# Patient Record
Sex: FEMALE | Race: WHITE | NOT HISPANIC OR LATINO | Employment: FULL TIME | URBAN - NONMETROPOLITAN AREA
[De-identification: names, ages, dates, MRNs, and addresses within clinical notes are randomized per-mention and may not be internally consistent; named-entity substitution may affect disease eponyms.]

---

## 2018-05-22 ENCOUNTER — OFFICE VISIT (OUTPATIENT)
Dept: OBSTETRICS AND GYNECOLOGY | Facility: CLINIC | Age: 63
End: 2018-05-22

## 2018-05-22 VITALS
DIASTOLIC BLOOD PRESSURE: 64 MMHG | SYSTOLIC BLOOD PRESSURE: 126 MMHG | HEIGHT: 62 IN | BODY MASS INDEX: 24.84 KG/M2 | WEIGHT: 135 LBS

## 2018-05-22 DIAGNOSIS — N95.1 MENOPAUSAL SYMPTOMS: ICD-10-CM

## 2018-05-22 DIAGNOSIS — N89.8 VAGINAL DRYNESS: ICD-10-CM

## 2018-05-22 DIAGNOSIS — N39.3 SUI (STRESS URINARY INCONTINENCE, FEMALE): ICD-10-CM

## 2018-05-22 DIAGNOSIS — Z80.0 FAMILY HISTORY OF PANCREATIC CANCER: ICD-10-CM

## 2018-05-22 DIAGNOSIS — Z01.419 WELL WOMAN EXAM WITH ROUTINE GYNECOLOGICAL EXAM: Primary | ICD-10-CM

## 2018-05-22 PROCEDURE — 99213 OFFICE O/P EST LOW 20 MIN: CPT | Performed by: NURSE PRACTITIONER

## 2018-05-22 PROCEDURE — 99396 PREV VISIT EST AGE 40-64: CPT | Performed by: NURSE PRACTITIONER

## 2018-05-22 RX ORDER — ASPIRIN 81 MG/1
81 TABLET, CHEWABLE ORAL DAILY
COMMUNITY
End: 2020-02-04

## 2018-05-22 RX ORDER — HYDROCHLOROTHIAZIDE 25 MG/1
25 TABLET ORAL
COMMUNITY
End: 2020-02-04

## 2018-05-22 NOTE — PROGRESS NOTES
Attempted to obtain health maintenance information, patient unable to provide answers for the following items Tdap, Diabetic Eye Exam, Diabetic Foot Exam, HPV Vaccine, Chlamydia Screening  and Zoster Vaccine  .

## 2018-05-22 NOTE — PATIENT INSTRUCTIONS

## 2018-05-22 NOTE — PROGRESS NOTES
"Subjective     Brianna Zambrano is a 63 y.o. female    Here for yearly check up. Has C/O MEGAN and has tried many of the OAB medications with no improvement.      Gynecologic Exam   The patient's pertinent negatives include no pelvic pain, vaginal bleeding or vaginal discharge. The patient is experiencing no pain. Associated symptoms include frequency and urgency. Pertinent negatives include no abdominal pain, anorexia, back pain, chills, constipation, diarrhea, discolored urine, dysuria, fever, flank pain, headaches, hematuria, joint pain, joint swelling, nausea, painful intercourse, rash, sore throat or vomiting. Nothing aggravates the symptoms. She is sexually active. She uses hysterectomy for contraception. She is postmenopausal.   Urinary Frequency    This is a chronic problem. The current episode started more than 1 year ago. The problem occurs intermittently. The problem has been waxing and waning. The patient is experiencing no pain. There has been no fever. Associated symptoms include frequency and urgency. Pertinent negatives include no chills, flank pain, hematuria, hesitancy, nausea, possible pregnancy, sweats or vomiting.         /63   Ht 157.5 cm (62\")   Wt 61.2 kg (135 lb)   LMP 05/22/1995 (Approximate) Comment: Abnormal Paps  BMI 24.69 kg/m²     Outpatient Encounter Prescriptions as of 5/22/2018   Medication Sig Dispense Refill   • aspirin 81 MG chewable tablet Chew 81 mg Daily.     • estrogens, conjugated, (PREMARIN) 0.625 MG tablet Take 1 tablet by mouth Daily. 30 tablet 12   • hydrochlorothiazide (HYDRODIURIL) 25 MG tablet Take 25 mg by mouth.     • [DISCONTINUED] estrogens, conjugated, (PREMARIN) 0.625 MG tablet Take 0.625 mg by mouth.       No facility-administered encounter medications on file as of 5/22/2018.        Surgical History  Past Surgical History:   Procedure Laterality Date   • ADENOIDECTOMY     • ANKLE MEDIAL MALLEOUS EPIPHYSIODESIS W/ SCREW FIXATION     • APPENDECTOMY     • " BREAST BIOPSY     •  SECTION     • CHOLECYSTECTOMY     • COLONOSCOPY  2016   • FIRST RIB RESECTION     • HYSTERECTOMY      with BSO   • MASTECTOMY      subcutaneous   • SHOULDER SURGERY     • TONSILLECTOMY     • WISDOM TOOTH EXTRACTION         Family History  Family History   Problem Relation Age of Onset   • Prostate cancer Father    • Stroke Mother    • Heart disease Brother    • Lymphoma Sister    • Lung cancer Brother    • Pancreatic cancer Paternal Grandmother    • Breast cancer Neg Hx    • Ovarian cancer Neg Hx    • Uterine cancer Neg Hx    • Colon cancer Neg Hx    • Melanoma Neg Hx        The following portions of the patient's history were reviewed and updated as appropriate: allergies, current medications, past family history, past medical history, past social history, past surgical history and problem list.    Review of Systems   Constitutional: Negative for activity change, appetite change, chills, diaphoresis, fatigue, fever and unexpected weight change.   HENT: Negative for congestion, dental problem, drooling, ear discharge, ear pain, facial swelling, hearing loss, mouth sores, nosebleeds, postnasal drip, rhinorrhea, sinus pain, sinus pressure, sneezing, sore throat, tinnitus, trouble swallowing and voice change.    Eyes: Negative for photophobia, pain, discharge, redness, itching and visual disturbance.   Respiratory: Negative for apnea, cough, choking, chest tightness, shortness of breath, wheezing and stridor.    Cardiovascular: Negative for chest pain, palpitations and leg swelling.   Gastrointestinal: Negative for abdominal distention, abdominal pain, anal bleeding, anorexia, blood in stool, constipation, diarrhea, nausea, rectal pain and vomiting.   Endocrine: Negative for cold intolerance, heat intolerance, polydipsia, polyphagia and polyuria.   Genitourinary: Positive for frequency and urgency. Negative for decreased urine volume, difficulty urinating, dyspareunia, dysuria, enuresis,  flank pain, genital sores, hematuria, hesitancy, menstrual problem, pelvic pain, vaginal bleeding, vaginal discharge and vaginal pain.   Musculoskeletal: Negative for arthralgias, back pain, gait problem, joint pain, joint swelling, myalgias, neck pain and neck stiffness.   Skin: Negative for color change, pallor, rash and wound.   Allergic/Immunologic: Negative for environmental allergies, food allergies and immunocompromised state.   Neurological: Negative for dizziness, tremors, seizures, syncope, facial asymmetry, speech difficulty, weakness, light-headedness, numbness and headaches.   Hematological: Negative for adenopathy. Does not bruise/bleed easily.   Psychiatric/Behavioral: Negative for agitation, behavioral problems, confusion, decreased concentration, dysphoric mood, hallucinations, self-injury, sleep disturbance and suicidal ideas. The patient is not nervous/anxious and is not hyperactive.        Objective   Physical Exam   Constitutional: She is oriented to person, place, and time. She appears well-developed and well-nourished.   HENT:   Head: Normocephalic and atraumatic.   Right Ear: External ear normal.   Left Ear: External ear normal.   Eyes: Conjunctivae and EOM are normal. Right eye exhibits no discharge. Left eye exhibits no discharge. No scleral icterus.   Neck: Normal range of motion. Neck supple. Carotid bruit is not present. No thyromegaly present.   Cardiovascular: Regular rhythm and normal heart sounds.    No murmur heard.  Pulmonary/Chest: Effort normal and breath sounds normal. No respiratory distress. Right breast exhibits no inverted nipple, no mass, no nipple discharge, no skin change and no tenderness. Left breast exhibits no inverted nipple, no mass, no nipple discharge, no skin change and no tenderness. Breasts are symmetrical. There is no breast swelling.   Abdominal: Soft. Bowel sounds are normal. She exhibits no distension and no mass. There is no tenderness. There is no  guarding. No hernia. Hernia confirmed negative in the right inguinal area and confirmed negative in the left inguinal area.   Genitourinary: Vagina normal. Rectal exam shows no mass. No breast tenderness, discharge or bleeding. There is no rash, tenderness, lesion or injury on the right labia. There is no rash, tenderness, lesion or injury on the left labia. No erythema or bleeding in the vagina. No signs of injury around the vagina. No vaginal discharge found.   Genitourinary Comments: Cervix, Uterus and Adnexa are surgically absent.  Urethra and urethral meatus normal  Bladder, mild prolapse  Perineum and anus examined and without lesions   Musculoskeletal: Normal range of motion. She exhibits no edema or tenderness.   Lymphadenopathy:        Head (right side): No submental, no submandibular, no tonsillar, no preauricular, no posterior auricular and no occipital adenopathy present.        Head (left side): No submental, no submandibular, no tonsillar, no preauricular, no posterior auricular and no occipital adenopathy present.     She has no cervical adenopathy.        Right cervical: No superficial cervical, no deep cervical and no posterior cervical adenopathy present.       Left cervical: No superficial cervical, no deep cervical and no posterior cervical adenopathy present.     She has no axillary adenopathy.        Right: No inguinal adenopathy present.        Left: No inguinal adenopathy present.   Neurological: She is alert and oriented to person, place, and time. She exhibits normal muscle tone. Coordination normal.   Skin: Skin is warm and dry. No bruising and no rash noted. No erythema.   Psychiatric: She has a normal mood and affect. Her behavior is normal. Judgment and thought content normal.   Nursing note and vitals reviewed.      Assessment/Plan   Brianna was seen today for gynecologic exam.    Diagnoses and all orders for this visit:    Well woman exam with routine gynecological exam  Normal GYN  exam. Will have lab work at PCP. Encouraged SBE, pt is aware how to do self breast exam and the importance of same. Discussed weight management and importance of maintaining a healthy weight. Discussed Vitamin D intake and the importance of adequate vitamin D for both Bone Health and a healthy immune system.  Discussed Daily exercise and the importance of same, in regards to a healthy heart as well as helping to maintain her weight and improving her mental health.  BMI 24.7.  Colonoscopy is up to date.  Mammogram and Bone Density  will be scheduled at [facility], per pt request. Pap smear is not done per ASCCP guidelines.  -     DEXA Bone Density Axial; Future  -     Mammo Screening Digital Tomosynthesis Bilateral With CAD; Future    Menopausal symptoms  Comments:  Is doing well with Premarin and wishes to continue.   Orders:  -     estrogens, conjugated, (PREMARIN) 0.625 MG tablet; Take 1 tablet by mouth Daily.    Family history of pancreatic cancer  Pt has a family history of Pancreatic Cancer. We discussed [facility] Genetic screening that can be done to see if she carries the Gene for Breast, Ovarian, Colon, Melanoma, Uterine and Pancreatic Cancers. We discussed if positive she will have free Genetic counseling through [facility]. We also discussed if she does have the positive gene she can have more testing yearly and even surgery if desired.      MEGAN (stress urinary incontinence, female)  Comments:  Discussed Kegel exercises and Azra Benita Touch.    Vaginal dryness  Comments:  Discussed using Coconut Oil daily.         Patient's Body mass index is 24.69 kg/m². BMI is within normal parameters. No follow-up required.      Janie Sung, APRN  5/22/2018

## 2018-08-15 ENCOUNTER — APPOINTMENT (OUTPATIENT)
Dept: ULTRASOUND IMAGING | Facility: HOSPITAL | Age: 63
End: 2018-08-15

## 2018-08-15 ENCOUNTER — HOSPITAL ENCOUNTER (EMERGENCY)
Facility: HOSPITAL | Age: 63
Discharge: HOME | End: 2018-08-15
Attending: EMERGENCY MEDICINE | Admitting: EMERGENCY MEDICINE

## 2018-08-15 VITALS
BODY MASS INDEX: 23 KG/M2 | HEART RATE: 68 BPM | DIASTOLIC BLOOD PRESSURE: 79 MMHG | TEMPERATURE: 97.8 F | SYSTOLIC BLOOD PRESSURE: 134 MMHG | WEIGHT: 125 LBS | OXYGEN SATURATION: 97 % | HEIGHT: 62 IN | RESPIRATION RATE: 14 BRPM

## 2018-08-15 DIAGNOSIS — I82.452 ACUTE DEEP VEIN THROMBOSIS (DVT) OF LEFT PERONEAL VEIN (HCC): Primary | ICD-10-CM

## 2018-08-15 LAB
ANION GAP SERPL CALCULATED.3IONS-SCNC: 8 MMOL/L (ref 4–13)
APTT PPP: 26.3 SECONDS (ref 24.1–34.8)
BASOPHILS # BLD AUTO: 0.04 10*3/MM3 (ref 0–0.2)
BASOPHILS NFR BLD AUTO: 0.6 % (ref 0–2)
BUN BLD-MCNC: 14 MG/DL (ref 5–21)
BUN/CREAT SERPL: 22.6 (ref 7–25)
CALCIUM SPEC-SCNC: 9.2 MG/DL (ref 8.4–10.4)
CHLORIDE SERPL-SCNC: 100 MMOL/L (ref 98–110)
CO2 SERPL-SCNC: 32 MMOL/L (ref 24–31)
CREAT BLD-MCNC: 0.62 MG/DL (ref 0.5–1.4)
DEPRECATED RDW RBC AUTO: 39.8 FL (ref 40–54)
EOSINOPHIL # BLD AUTO: 0.08 10*3/MM3 (ref 0–0.7)
EOSINOPHIL NFR BLD AUTO: 1.2 % (ref 0–4)
ERYTHROCYTE [DISTWIDTH] IN BLOOD BY AUTOMATED COUNT: 12.5 % (ref 12–15)
GFR SERPL CREATININE-BSD FRML MDRD: 97 ML/MIN/1.73
GLUCOSE BLD-MCNC: 105 MG/DL (ref 70–100)
HCT VFR BLD AUTO: 39.5 % (ref 37–47)
HGB BLD-MCNC: 13.4 G/DL (ref 12–16)
HOLD SPECIMEN: NORMAL
HOLD SPECIMEN: NORMAL
IMM GRANULOCYTES # BLD: 0.02 10*3/MM3 (ref 0–0.03)
IMM GRANULOCYTES NFR BLD: 0.3 % (ref 0–5)
INR PPP: 0.81 (ref 0.91–1.09)
LYMPHOCYTES # BLD AUTO: 1.83 10*3/MM3 (ref 0.72–4.86)
LYMPHOCYTES NFR BLD AUTO: 26.4 % (ref 15–45)
MCH RBC QN AUTO: 29.5 PG (ref 28–32)
MCHC RBC AUTO-ENTMCNC: 33.9 G/DL (ref 33–36)
MCV RBC AUTO: 87 FL (ref 82–98)
MONOCYTES # BLD AUTO: 0.55 10*3/MM3 (ref 0.19–1.3)
MONOCYTES NFR BLD AUTO: 7.9 % (ref 4–12)
NEUTROPHILS # BLD AUTO: 4.41 10*3/MM3 (ref 1.87–8.4)
NEUTROPHILS NFR BLD AUTO: 63.6 % (ref 39–78)
NRBC BLD MANUAL-RTO: 0 /100 WBC (ref 0–0)
PLATELET # BLD AUTO: 324 10*3/MM3 (ref 130–400)
PMV BLD AUTO: 11 FL (ref 6–12)
POTASSIUM BLD-SCNC: 3.5 MMOL/L (ref 3.5–5.3)
PROTHROMBIN TIME: 11.4 SECONDS (ref 11.9–14.6)
RBC # BLD AUTO: 4.54 10*6/MM3 (ref 4.2–5.4)
SODIUM BLD-SCNC: 140 MMOL/L (ref 135–145)
WBC NRBC COR # BLD: 6.93 10*3/MM3 (ref 4.8–10.8)
WHOLE BLOOD HOLD SPECIMEN: NORMAL
WHOLE BLOOD HOLD SPECIMEN: NORMAL

## 2018-08-15 PROCEDURE — 85730 THROMBOPLASTIN TIME PARTIAL: CPT | Performed by: EMERGENCY MEDICINE

## 2018-08-15 PROCEDURE — 25010000002 FENTANYL CITRATE (PF) 100 MCG/2ML SOLUTION: Performed by: EMERGENCY MEDICINE

## 2018-08-15 PROCEDURE — 96374 THER/PROPH/DIAG INJ IV PUSH: CPT

## 2018-08-15 PROCEDURE — 96372 THER/PROPH/DIAG INJ SC/IM: CPT

## 2018-08-15 PROCEDURE — 25010000002 ENOXAPARIN PER 10 MG: Performed by: EMERGENCY MEDICINE

## 2018-08-15 PROCEDURE — 80048 BASIC METABOLIC PNL TOTAL CA: CPT | Performed by: EMERGENCY MEDICINE

## 2018-08-15 PROCEDURE — 93971 EXTREMITY STUDY: CPT | Performed by: SURGERY

## 2018-08-15 PROCEDURE — 99284 EMERGENCY DEPT VISIT MOD MDM: CPT

## 2018-08-15 PROCEDURE — 93971 EXTREMITY STUDY: CPT

## 2018-08-15 PROCEDURE — 85025 COMPLETE CBC W/AUTO DIFF WBC: CPT | Performed by: EMERGENCY MEDICINE

## 2018-08-15 PROCEDURE — 96376 TX/PRO/DX INJ SAME DRUG ADON: CPT

## 2018-08-15 PROCEDURE — 85610 PROTHROMBIN TIME: CPT | Performed by: EMERGENCY MEDICINE

## 2018-08-15 RX ORDER — FENTANYL CITRATE 50 UG/ML
50 INJECTION, SOLUTION INTRAMUSCULAR; INTRAVENOUS ONCE
Status: COMPLETED | OUTPATIENT
Start: 2018-08-15 | End: 2018-08-15

## 2018-08-15 RX ORDER — KETOROLAC TROMETHAMINE 15 MG/ML
15 INJECTION, SOLUTION INTRAMUSCULAR; INTRAVENOUS ONCE
Status: DISCONTINUED | OUTPATIENT
Start: 2018-08-15 | End: 2018-08-15

## 2018-08-15 RX ORDER — FENTANYL CITRATE 50 UG/ML
50 INJECTION, SOLUTION INTRAMUSCULAR; INTRAVENOUS
Status: DISCONTINUED | OUTPATIENT
Start: 2018-08-15 | End: 2018-08-15 | Stop reason: HOSPADM

## 2018-08-15 RX ADMIN — FENTANYL CITRATE 50 MCG: 50 INJECTION, SOLUTION INTRAMUSCULAR; INTRAVENOUS at 05:59

## 2018-08-15 RX ADMIN — ENOXAPARIN SODIUM 60 MG: 60 INJECTION SUBCUTANEOUS at 06:39

## 2018-08-15 RX ADMIN — FENTANYL CITRATE 50 MCG: 50 INJECTION INTRAMUSCULAR; INTRAVENOUS at 07:03

## 2018-08-15 NOTE — ED PROVIDER NOTES
Subjective   This is a 63-year-old female who presents to the emergency department for evaluation of left calf discomfort.  Patient states that she woke up this morning with left calf pain.  Fall or injury.  Patient does have a left lower extremity cast placed 8 days ago by Dr. Rutledge for abnormal MRI scan.  She indicates that she was found to have a subchondral cyst on her talar dome.  She is an  for Dr. Silver who called in an outpatient d-dimer to be performed this morning.  It was elevated so Dr. Silvre referred her to this emergency department for further testing.  Patient denies a known history of DVT.  She does not feel short of breath.  No cough or wheezing.  No chest pain, palpitations, or near-syncope.  No abdominal discomfort.  No nausea/vomiting.  No dysuria or hematuria.  No recent illness or fever.  She has no additional concerns.            Review of Systems   All other systems reviewed and are negative.      Past Medical History:   Diagnosis Date   • Abnormal Pap smear of cervix    • Hypertension        Allergies   Allergen Reactions   • Clindamycin Diarrhea     Had severe colitis took years to heal   • Demerol [Meperidine] Nausea And Vomiting   • Morphine And Related Nausea And Vomiting   • Toradol [Ketorolac Tromethamine] Nausea And Vomiting   • Penicillins Rash       Past Surgical History:   Procedure Laterality Date   • ADENOIDECTOMY     • ANKLE MEDIAL MALLEOUS EPIPHYSIODESIS W/ SCREW FIXATION     • APPENDECTOMY     • BREAST BIOPSY     •  SECTION     • CHOLECYSTECTOMY     • COLONOSCOPY     • FIRST RIB RESECTION     • HYSTERECTOMY      with BSO   • MASTECTOMY      subcutaneous   • SHOULDER SURGERY     • TONSILLECTOMY     • WISDOM TOOTH EXTRACTION         Family History   Problem Relation Age of Onset   • Prostate cancer Father    • Stroke Mother    • Heart disease Brother    • Lymphoma Sister    • Lung cancer Brother    • Pancreatic cancer Paternal Grandmother    • Breast  cancer Neg Hx    • Ovarian cancer Neg Hx    • Uterine cancer Neg Hx    • Colon cancer Neg Hx    • Melanoma Neg Hx        Social History     Social History   • Marital status:      Social History Main Topics   • Smoking status: Never Smoker   • Smokeless tobacco: Never Used   • Alcohol use Yes      Comment: occ   • Drug use: No   • Sexual activity: Yes     Birth control/ protection: Post-menopausal     Other Topics Concern   • Not on file           Objective   Physical Exam   Constitutional: She is oriented to person, place, and time. She appears well-developed and well-nourished.   Eyes: Pupils are equal, round, and reactive to light. EOM are normal.   Neck: Normal range of motion. Neck supple. No JVD present. No tracheal deviation present.   Cardiovascular: Normal rate, regular rhythm and normal heart sounds.    Pulmonary/Chest: Effort normal and breath sounds normal. No respiratory distress. She has no wheezes. She exhibits no tenderness.   Abdominal: Soft. Bowel sounds are normal. She exhibits no distension. There is no tenderness. There is no guarding.   Musculoskeletal: She exhibits no edema or deformity.   Left lower extremity cast in place   Neurological: She is alert and oriented to person, place, and time.   Skin: Skin is warm and dry. Capillary refill takes less than 2 seconds.   Psychiatric: She has a normal mood and affect. Her behavior is normal.   Nursing note and vitals reviewed.      Procedures       LLE US      ED Course  ED Course as of Aug 15 0820   Wed Aug 15, 2018   0701 I took over from Dr. Boucher at shift change.  Told patient of US report.  She wants to wait and talk with Dr. Rutledge about what to do about her cast.  Still having significant pain.  [TR]   0816 Spoke with Dr. Rutledge who advised to send patient to his office now.  Have told patient of this.  [TR]      ED Course User Index  [TR] Neto Davis Jr., MD      US showing DVT left peroneal veins    Patient updated  Patient now  requesting something for discomfort    Labs ordered    Medication allergies reviewed  She did recently take tylenol  Fentanyl ordered    Labs Reviewed   BASIC METABOLIC PANEL - Abnormal; Notable for the following:        Result Value    Glucose 105 (*)     CO2 32.0 (*)     All other components within normal limits    Narrative:     GFR Normal >60  Chronic Kidney Disease <60  Kidney Failure <15   PROTIME-INR - Abnormal; Notable for the following:     Protime 11.4 (*)     INR 0.81 (*)     All other components within normal limits   CBC WITH AUTO DIFFERENTIAL - Abnormal; Notable for the following:     RDW-SD 39.8 (*)     All other components within normal limits   APTT - Normal   RAINBOW DRAW    Narrative:     The following orders were created for panel order [place] Draw.  Procedure                               Abnormality         Status                     ---------                               -----------         ------                     Light Blue Top[[id number]]                                   Final result               Green Top (Gel)[[id number]]                                  Final result               Lavender Top[[id number]]                                     Final result               Red Top[[id number]]                                          Final result                 Please view results for these tests on the individual orders.   LIGHT BLUE TOP   GREEN TOP   LAVENDER TOP   RED TOP   CBC AND DIFFERENTIAL    Narrative:     The following orders were created for panel order CBC & Differential.  Procedure                               Abnormality         Status                     ---------                               -----------         ------                     CBC Auto Differential[[id number]]        Abnormal            Final result                 Please view results for these tests on the individual orders.               MDM  Number of Diagnoses or Management Options  Acute deep vein thrombosis  (DVT) of left peroneal vein (CMS/HCC): new and requires workup     Amount and/or Complexity of Data Reviewed  Tests in the radiology section of CPT®: ordered and reviewed  Discuss the patient with other providers: yes    Risk of Complications, Morbidity, and/or Mortality  Presenting problems: moderate  Diagnostic procedures: moderate  Management options: moderate    Patient Progress  Patient progress: stable     Given recent immobilization patient is likely to have a left lower extremity DVT  Cast will be removed so ultrasound can be performed  Left lower extremity will then be splinted    Posterior short leg splint placed  Remains neurovascularly intact after placement    Hospitalist paged for admission  I spoke with Dr. Sanchez who requests admission held for day team  Lovenox ordered      Final diagnoses:   Acute deep vein thrombosis (DVT) of left peroneal vein (CMS/HCC)            Neto Davis Jr., MD  08/15/18 0816

## 2018-08-15 NOTE — ED NOTES
PTS CAST WAS REMOVED FROM LEFT LOWER LEG AT THIS TIME. PT TOLERATED WELL. PULSES WERE FELT IN BOTH FEET. LEFT LOWER LEG APPEARS NORMAL WITH NO SWELLING, OR REDNESS TO CALF AREA. PT STILL  C/O PAIN IN LEFT LOWER LEG. PT STATES SHE CAN FEEL ME TOUCHING HER LEG AND SAYS SHE FEELS NO TINGLING.          Gabe Singleton RN  08/15/18 0449

## 2018-10-09 ENCOUNTER — OFFICE VISIT (OUTPATIENT)
Dept: ORTHOPEDIC SURGERY | Age: 63
End: 2018-10-09
Payer: COMMERCIAL

## 2018-10-09 VITALS — HEIGHT: 62 IN | WEIGHT: 125 LBS | BODY MASS INDEX: 23 KG/M2

## 2018-10-09 DIAGNOSIS — M19.072 INFLAMMATION OF ANKLE JOINT, LEFT: ICD-10-CM

## 2018-10-09 DIAGNOSIS — M95.8 OSTEOCHONDRAL DEFECT OF TALUS: Primary | ICD-10-CM

## 2018-10-09 PROCEDURE — 99204 OFFICE O/P NEW MOD 45 MIN: CPT | Performed by: ORTHOPAEDIC SURGERY

## 2018-10-09 PROCEDURE — L1902 AFO ANKLE GAUNTLET PRE OTS: HCPCS | Performed by: ORTHOPAEDIC SURGERY

## 2018-10-09 NOTE — PROGRESS NOTES
Chief Complaint    New Patient (L Ankle)      History of Present Illness:  Kiki Manning is a 58 y.o. female is here for evaluation chief complaint of left ankle pain. She states that this started somewhere around mid June when she twisted her left ankle. She was seen by a physician in [address] [place] [address] and they casted her. She developed a DVT and is currently on Xarelto. She was subsequently placed into a boot and has been nonweightbearing for 11 weeks. On October 2, 2018 she was told to gradually increase her weightbearing and currently she is 100% weightbearing in her boot. States she really has never had any pain in this area. She had an MRI scan that showed a small lateral talar dome osteochondral defect with no evidence of surrounding marrow edema. She also was found to have inflammation within the tibiotalar joint. 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 She likes to VTMing. She is here as a 2nd opinion    Medical History:  Patient's medications, allergies, past medical, surgical, social and family histories were reviewed and updated as appropriate. Review of Systems:  Pertinent items are noted in HPI  Review of systems reviewed from Patient History Form dated on October 9, 2018 and available in the patient's chart under the Media tab. Vital Signs:  Ht 5' 2\" (1.575 m)   Wt 125 lb (56.7 kg)   BMI 22.86 kg/m²     General Exam:   Constitutional: Patient is adequately groomed with no evidence of malnutrition  DTRs: Deep tendon reflexes are intact  Mental Status: The patient is oriented to time, place and person. The patient's mood and affect are appropriate. Lymphatic: The lymphatic examination bilaterally reveals all areas to be without enlargement or induration.     Foot Examination:    Inspection:  No significant swelling erythema or ecchymosis left ankle    Palpation:  Minimal to no tenderness into the left ankle    Range of

## 2018-10-10 ENCOUNTER — TELEPHONE (OUTPATIENT)
Dept: OBSTETRICS AND GYNECOLOGY | Facility: CLINIC | Age: 63
End: 2018-10-10

## 2018-10-10 NOTE — TELEPHONE ENCOUNTER
"Pt recently had a DVT and wants to stop her hormone therapy. Wants to know the best way to \"wean\" off the hormones with the least symptomatic effect.   "

## 2018-11-21 ENCOUNTER — OFFICE VISIT (OUTPATIENT)
Dept: ORTHOPEDIC SURGERY | Age: 63
End: 2018-11-21
Payer: COMMERCIAL

## 2018-11-21 VITALS — BODY MASS INDEX: 23 KG/M2 | HEIGHT: 62 IN | WEIGHT: 125 LBS

## 2018-11-21 DIAGNOSIS — M25.572 LEFT ANKLE PAIN, UNSPECIFIED CHRONICITY: Primary | ICD-10-CM

## 2018-11-21 PROCEDURE — 99212 OFFICE O/P EST SF 10 MIN: CPT | Performed by: ORTHOPAEDIC SURGERY

## 2018-11-21 NOTE — PROGRESS NOTES
Subjective: Patient states that she is here for follow-up of her left ankle lateral talar osteochondral defect with no marrow edema by MRI scan. She states that she is much better. She has been walking outside the boot without a brace. She's been to physical therapy has used Voltaren gel and his son Xarelto right now for her previous DVT. She is here with her   Objective: Physical exam shows minimal swelling in the left ankle. She has tenderness over the anterior lateral aspect of the ankle including in the region of the peroneus tertius. Anterior drawer and talar tilt showed mild laxity no midfoot instability. Strength is 4/5 in a dorsiflexion plantarflexion  Imaging: 3 views of the left ankle show the lateral talar osteochondral defect. Assessment and plan: Again the OCD had no marrow edema around it but she seems to be most tender in that area. She is improved and states that now her pain is just a toothache.   She's happy with her results I'll see her back in 6 weeks I could inject her in the future to decide whether this will OCD was problematic

## 2018-12-21 ENCOUNTER — OFFICE VISIT (OUTPATIENT)
Dept: ORTHOPEDIC SURGERY | Age: 63
End: 2018-12-21
Payer: COMMERCIAL

## 2018-12-21 VITALS — WEIGHT: 125 LBS | BODY MASS INDEX: 23 KG/M2 | HEIGHT: 62 IN

## 2018-12-21 DIAGNOSIS — M25.372 ANKLE INSTABILITY, LEFT: Primary | ICD-10-CM

## 2018-12-21 PROCEDURE — L1902 AFO ANKLE GAUNTLET PRE OTS: HCPCS | Performed by: ORTHOPAEDIC SURGERY

## 2018-12-21 PROCEDURE — 99212 OFFICE O/P EST SF 10 MIN: CPT | Performed by: ORTHOPAEDIC SURGERY

## 2018-12-21 NOTE — PROGRESS NOTES
Subjective: Patient states that she is here for follow-up of her anterior lateral ankle instability and talar osteochondral defect. She states she is doing much better she feels like she is getting stronger all the time she uses her Safia brace on a regular basis and points to the anterior lateral aspect of her ankles the most painful area. She has trouble going up and down steps  Objective: Physical exam shows anterior drawer and talar tilt showed mild laxity but a good end feel. Strength is 4+ over 5 dorsiflexion plantarflexion no midfoot instability sensations intact 20° of dorsiflexion and 40° of plantarflexion Imaging:  Assessment and plan:  This patient is doing well she will wean out of the brace around her house follow-up with me in 6 weeks as needed only

## 2019-01-17 ENCOUNTER — OFFICE VISIT (OUTPATIENT)
Dept: OBSTETRICS AND GYNECOLOGY | Facility: CLINIC | Age: 64
End: 2019-01-17

## 2019-01-17 VITALS
WEIGHT: 133 LBS | DIASTOLIC BLOOD PRESSURE: 68 MMHG | SYSTOLIC BLOOD PRESSURE: 148 MMHG | HEIGHT: 62 IN | BODY MASS INDEX: 24.48 KG/M2

## 2019-01-17 DIAGNOSIS — N95.1 MENOPAUSAL SYMPTOMS: Primary | ICD-10-CM

## 2019-01-17 PROCEDURE — 99213 OFFICE O/P EST LOW 20 MIN: CPT | Performed by: NURSE PRACTITIONER

## 2019-01-17 RX ORDER — VENLAFAXINE HYDROCHLORIDE 37.5 MG/1
37.5 CAPSULE, EXTENDED RELEASE ORAL DAILY
Qty: 30 CAPSULE | Refills: 12 | Status: SHIPPED | OUTPATIENT
Start: 2019-01-17 | End: 2020-02-04

## 2019-01-17 NOTE — PROGRESS NOTES
"Subjective     Brianna Zambrano is a 63 y.o. female    Here for C/O hot flashes. Had to stop Premarin due to a DVT when she was wearing.     Other   This is a recurrent (Menopausal symptoms) problem. The current episode started more than 1 month ago. The problem occurs daily. The problem has been gradually worsening. Pertinent negatives include no abdominal pain, anorexia, arthralgias, change in bowel habit, chest pain, chills, congestion, coughing, diaphoresis, fatigue, fever, headaches, joint swelling, myalgias, nausea, neck pain, numbness, rash, sore throat, swollen glands, urinary symptoms, vertigo, visual change, vomiting or weakness. Nothing aggravates the symptoms. She has tried nothing for the symptoms.         /68   Ht 157.5 cm (62\")   Wt 60.3 kg (133 lb)   LMP 1995 (Approximate) Comment: Abnormal Paps  Breastfeeding? No   BMI 24.33 kg/m²     Outpatient Encounter Medications as of 2019   Medication Sig Dispense Refill   • aspirin 81 MG chewable tablet Chew 81 mg Daily.     • hydrochlorothiazide (HYDRODIURIL) 25 MG tablet Take 25 mg by mouth.     • venlafaxine XR (EFFEXOR XR) 37.5 MG 24 hr capsule Take 1 capsule by mouth Daily. 30 capsule 12   • [DISCONTINUED] estrogens, conjugated, (PREMARIN) 0.625 MG tablet Take 1 tablet by mouth Daily. 30 tablet 12   • [DISCONTINUED] rivaroxaban (XARELTO) 15 MG tablet Take 1 tablet by mouth 2 (Two) Times a Day With Meals. 42 tablet 0     No facility-administered encounter medications on file as of 2019.        Surgical History  Past Surgical History:   Procedure Laterality Date   • ADENOIDECTOMY     • ANKLE MEDIAL MALLEOUS EPIPHYSIODESIS W/ SCREW FIXATION     • APPENDECTOMY     • BREAST BIOPSY     •  SECTION     • CHOLECYSTECTOMY     • COLONOSCOPY  2016   • FIRST RIB RESECTION     • HYSTERECTOMY      with BSO   • MASTECTOMY      subcutaneous   • SHOULDER SURGERY     • TONSILLECTOMY     • WISDOM TOOTH EXTRACTION         Family History  Family " History   Problem Relation Age of Onset   • Prostate cancer Father    • Stroke Mother    • Heart disease Brother    • Lymphoma Sister    • Lung cancer Brother    • Pancreatic cancer Paternal Grandmother    • Breast cancer Neg Hx    • Ovarian cancer Neg Hx    • Uterine cancer Neg Hx    • Colon cancer Neg Hx    • Melanoma Neg Hx        The following portions of the patient's history were reviewed and updated as appropriate: allergies, current medications, past family history, past medical history, past social history, past surgical history and problem list.    Review of Systems   Constitutional: Negative for activity change, appetite change, chills, diaphoresis, fatigue, fever, unexpected weight gain and unexpected weight loss.   HENT: Negative for congestion, dental problem, drooling, ear discharge, ear pain, facial swelling, hearing loss, mouth sores, nosebleeds, postnasal drip, rhinorrhea, sinus pressure, sneezing, sore throat, tinnitus, trouble swallowing and voice change.    Eyes: Negative for blurred vision, double vision, photophobia, pain, discharge, redness, itching and visual disturbance.   Respiratory: Negative for apnea, cough, choking, chest tightness, shortness of breath, wheezing and stridor.    Cardiovascular: Negative for chest pain, palpitations and leg swelling.   Gastrointestinal: Negative for abdominal distention, abdominal pain, anal bleeding, anorexia, blood in stool, change in bowel habit, constipation, diarrhea, nausea, rectal pain, vomiting, GERD and indigestion.   Endocrine: Positive for heat intolerance. Negative for cold intolerance, polydipsia, polyphagia and polyuria.   Genitourinary: Negative for breast discharge, urinary incontinence, decreased libido, decreased urine volume, difficulty urinating, dyspareunia, dysuria, flank pain, frequency, genital sores, hematuria, menstrual problem, pelvic pain, urgency, vaginal bleeding, vaginal discharge, vaginal pain, breast lump and breast pain.    Musculoskeletal: Negative for arthralgias, back pain, gait problem, joint swelling, myalgias, neck pain, neck stiffness and bursitis.   Skin: Negative for color change, dry skin and rash.   Allergic/Immunologic: Negative for environmental allergies, food allergies and immunocompromised state.   Neurological: Negative for dizziness, vertigo, tremors, seizures, syncope, facial asymmetry, speech difficulty, weakness, light-headedness, numbness, headache, memory problem and confusion.   Hematological: Negative for adenopathy. Does not bruise/bleed easily.   Psychiatric/Behavioral: Negative for agitation, behavioral problems, decreased concentration, dysphoric mood, hallucinations, self-injury, sleep disturbance, suicidal ideas, negative for hyperactivity, depressed mood and stress. The patient is not nervous/anxious.        Objective   Physical Exam   Constitutional: She is oriented to person, place, and time. She appears well-developed and well-nourished.   HENT:   Head: Normocephalic and atraumatic.   Eyes: Conjunctivae are normal. Right eye exhibits no discharge. Left eye exhibits no discharge.   Neck: Normal range of motion. Neck supple. No thyromegaly present.   Cardiovascular: Normal rate, regular rhythm and normal heart sounds.   Pulmonary/Chest: Effort normal and breath sounds normal.   Neurological: She is alert and oriented to person, place, and time.   Skin: Skin is warm and dry.   Psychiatric: She has a normal mood and affect. Her behavior is normal. Judgment and thought content normal.   Nursing note and vitals reviewed.      Assessment/Plan   Brianna was seen today for menopause.    Diagnoses and all orders for this visit:    Menopausal symptoms  Comments:  Pt was on Premarin and had to stop it due to a DVT that she got when she had a cast on. We discussed Effexor and she is not sure she wants to try it. She is given a printed RX to fill if she decides to start it. She will RTO for yearly in April.    Orders:  -     venlafaxine XR (EFFEXOR XR) 37.5 MG 24 hr capsule; Take 1 capsule by mouth Daily.         Patient's Body mass index is 24.33 kg/m². BMI is within normal parameters. No follow-up required..      Janie Sung, APRN  1/17/2019

## 2019-01-17 NOTE — PROGRESS NOTES
Attempted to obtain health maintenance information, patient unable to provide answers for the following items Pneumococcal Vaccine, Medicare Annual Wellness Exam, Diabetic Eye Exam, Diabetic Foot Exam, HPV Vaccine, Chlamydia Screening  and Zoster Vaccine.

## 2019-01-17 NOTE — PATIENT INSTRUCTIONS

## 2019-01-25 ENCOUNTER — OFFICE VISIT (OUTPATIENT)
Dept: ORTHOPEDIC SURGERY | Age: 64
End: 2019-01-25
Payer: COMMERCIAL

## 2019-01-25 VITALS — HEIGHT: 62 IN | BODY MASS INDEX: 23 KG/M2 | WEIGHT: 125 LBS

## 2019-01-25 DIAGNOSIS — M25.372 ANKLE INSTABILITY, LEFT: Primary | ICD-10-CM

## 2019-01-25 PROCEDURE — 20610 DRAIN/INJ JOINT/BURSA W/O US: CPT | Performed by: ORTHOPAEDIC SURGERY

## 2019-01-25 PROCEDURE — 99212 OFFICE O/P EST SF 10 MIN: CPT | Performed by: ORTHOPAEDIC SURGERY

## 2019-01-31 ENCOUNTER — TELEPHONE (OUTPATIENT)
Dept: ORTHOPEDIC SURGERY | Age: 64
End: 2019-01-31

## 2019-08-16 ENCOUNTER — OFFICE VISIT (OUTPATIENT)
Dept: ORTHOPEDIC SURGERY | Age: 64
End: 2019-08-16
Payer: MEDICARE

## 2019-08-16 VITALS — WEIGHT: 125 LBS | HEIGHT: 62 IN | BODY MASS INDEX: 23 KG/M2

## 2019-08-16 DIAGNOSIS — M25.372 ANKLE INSTABILITY, LEFT: Primary | ICD-10-CM

## 2019-08-16 PROCEDURE — 1036F TOBACCO NON-USER: CPT | Performed by: ORTHOPAEDIC SURGERY

## 2019-08-16 PROCEDURE — G8428 CUR MEDS NOT DOCUMENT: HCPCS | Performed by: ORTHOPAEDIC SURGERY

## 2019-08-16 PROCEDURE — 3017F COLORECTAL CA SCREEN DOC REV: CPT | Performed by: ORTHOPAEDIC SURGERY

## 2019-08-16 PROCEDURE — 99212 OFFICE O/P EST SF 10 MIN: CPT | Performed by: ORTHOPAEDIC SURGERY

## 2019-08-16 PROCEDURE — 4040F PNEUMOC VAC/ADMIN/RCVD: CPT | Performed by: ORTHOPAEDIC SURGERY

## 2019-08-16 PROCEDURE — 1090F PRES/ABSN URINE INCON ASSESS: CPT | Performed by: ORTHOPAEDIC SURGERY

## 2019-08-16 PROCEDURE — G8400 PT W/DXA NO RESULTS DOC: HCPCS | Performed by: ORTHOPAEDIC SURGERY

## 2019-08-16 PROCEDURE — 20605 DRAIN/INJ JOINT/BURSA W/O US: CPT | Performed by: ORTHOPAEDIC SURGERY

## 2019-08-16 PROCEDURE — G8420 CALC BMI NORM PARAMETERS: HCPCS | Performed by: ORTHOPAEDIC SURGERY

## 2019-08-16 PROCEDURE — 1123F ACP DISCUSS/DSCN MKR DOCD: CPT | Performed by: ORTHOPAEDIC SURGERY

## 2019-08-23 ENCOUNTER — HOSPITAL ENCOUNTER (EMERGENCY)
Age: 64
Discharge: HOME | End: 2019-08-24
Attending: EMERGENCY MEDICINE
Payer: MEDICARE

## 2019-08-23 ENCOUNTER — APPOINTMENT (OUTPATIENT)
Dept: GENERAL RADIOLOGY | Age: 64
End: 2019-08-23
Payer: MEDICARE

## 2019-08-23 ENCOUNTER — APPOINTMENT (OUTPATIENT)
Dept: CT IMAGING | Age: 64
End: 2019-08-23
Payer: MEDICARE

## 2019-08-23 DIAGNOSIS — R11.2 NON-INTRACTABLE VOMITING WITH NAUSEA, UNSPECIFIED VOMITING TYPE: ICD-10-CM

## 2019-08-23 DIAGNOSIS — R74.8 ELEVATED LIPASE: Primary | ICD-10-CM

## 2019-08-23 DIAGNOSIS — R10.13 ABDOMINAL PAIN, EPIGASTRIC: ICD-10-CM

## 2019-08-23 LAB
ALBUMIN SERPL-MCNC: 5 G/DL (ref 3.5–5.2)
ALP BLD-CCNC: 87 U/L (ref 35–104)
ALT SERPL-CCNC: 20 U/L (ref 5–33)
AMYLASE: 100 U/L (ref 28–100)
ANION GAP SERPL CALCULATED.3IONS-SCNC: 15 MMOL/L (ref 7–19)
AST SERPL-CCNC: 15 U/L (ref 5–32)
BACTERIA: NEGATIVE /HPF
BASOPHILS ABSOLUTE: 0 K/UL (ref 0–0.2)
BASOPHILS RELATIVE PERCENT: 0.2 % (ref 0–1)
BILIRUB SERPL-MCNC: 0.3 MG/DL (ref 0.2–1.2)
BILIRUBIN URINE: NEGATIVE
BLOOD, URINE: ABNORMAL
BUN BLDV-MCNC: 16 MG/DL (ref 8–23)
CALCIUM SERPL-MCNC: 9.9 MG/DL (ref 8.8–10.2)
CHLORIDE BLD-SCNC: 97 MMOL/L (ref 98–111)
CLARITY: CLEAR
CO2: 29 MMOL/L (ref 22–29)
COLOR: YELLOW
CREAT SERPL-MCNC: 0.7 MG/DL (ref 0.5–0.9)
EOSINOPHILS ABSOLUTE: 0 K/UL (ref 0–0.6)
EOSINOPHILS RELATIVE PERCENT: 0.2 % (ref 0–5)
EPITHELIAL CELLS, UA: 1 /HPF (ref 0–5)
GFR NON-AFRICAN AMERICAN: >60
GLUCOSE BLD-MCNC: 128 MG/DL (ref 74–109)
GLUCOSE URINE: NEGATIVE MG/DL
HCT VFR BLD CALC: 45.7 % (ref 37–47)
HEMOGLOBIN: 15.4 G/DL (ref 12–16)
HYALINE CASTS: 1 /HPF (ref 0–8)
IMMATURE GRANULOCYTES #: 0.1 K/UL
KETONES, URINE: NEGATIVE MG/DL
LEUKOCYTE ESTERASE, URINE: NEGATIVE
LIPASE: 186 U/L (ref 13–60)
LYMPHOCYTES ABSOLUTE: 2.7 K/UL (ref 1.1–4.5)
LYMPHOCYTES RELATIVE PERCENT: 25.2 % (ref 20–40)
MCH RBC QN AUTO: 30 PG (ref 27–31)
MCHC RBC AUTO-ENTMCNC: 33.7 G/DL (ref 33–37)
MCV RBC AUTO: 89.1 FL (ref 81–99)
MONOCYTES ABSOLUTE: 0.7 K/UL (ref 0–0.9)
MONOCYTES RELATIVE PERCENT: 6.5 % (ref 0–10)
NEUTROPHILS ABSOLUTE: 7.1 K/UL (ref 1.5–7.5)
NEUTROPHILS RELATIVE PERCENT: 67.1 % (ref 50–65)
NITRITE, URINE: NEGATIVE
PDW BLD-RTO: 12.6 % (ref 11.5–14.5)
PH UA: 7 (ref 5–8)
PLATELET # BLD: 403 K/UL (ref 130–400)
PMV BLD AUTO: 10.3 FL (ref 9.4–12.3)
POTASSIUM REFLEX MAGNESIUM: 3.6 MMOL/L (ref 3.5–5)
PROTEIN UA: NEGATIVE MG/DL
RBC # BLD: 5.13 M/UL (ref 4.2–5.4)
RBC UA: 9 /HPF (ref 0–4)
SODIUM BLD-SCNC: 141 MMOL/L (ref 136–145)
SPECIFIC GRAVITY UA: 1.02 (ref 1–1.03)
TOTAL PROTEIN: 8.1 G/DL (ref 6.6–8.7)
TROPONIN: <0.01 NG/ML (ref 0–0.03)
TROPONIN: <0.01 NG/ML (ref 0–0.03)
URINE REFLEX TO CULTURE: ABNORMAL
UROBILINOGEN, URINE: 0.2 E.U./DL
WBC # BLD: 10.6 K/UL (ref 4.8–10.8)
WBC UA: 1 /HPF (ref 0–5)

## 2019-08-23 PROCEDURE — 96374 THER/PROPH/DIAG INJ IV PUSH: CPT

## 2019-08-23 PROCEDURE — 2580000003 HC RX 258: Performed by: EMERGENCY MEDICINE

## 2019-08-23 PROCEDURE — 6360000004 HC RX CONTRAST MEDICATION: Performed by: EMERGENCY MEDICINE

## 2019-08-23 PROCEDURE — 93005 ELECTROCARDIOGRAM TRACING: CPT

## 2019-08-23 PROCEDURE — 82150 ASSAY OF AMYLASE: CPT

## 2019-08-23 PROCEDURE — 83690 ASSAY OF LIPASE: CPT

## 2019-08-23 PROCEDURE — 2500000003 HC RX 250 WO HCPCS: Performed by: EMERGENCY MEDICINE

## 2019-08-23 PROCEDURE — 71045 X-RAY EXAM CHEST 1 VIEW: CPT

## 2019-08-23 PROCEDURE — 81001 URINALYSIS AUTO W/SCOPE: CPT

## 2019-08-23 PROCEDURE — 96376 TX/PRO/DX INJ SAME DRUG ADON: CPT

## 2019-08-23 PROCEDURE — 36415 COLL VENOUS BLD VENIPUNCTURE: CPT

## 2019-08-23 PROCEDURE — 85025 COMPLETE CBC W/AUTO DIFF WBC: CPT

## 2019-08-23 PROCEDURE — 74177 CT ABD & PELVIS W/CONTRAST: CPT

## 2019-08-23 PROCEDURE — 96375 TX/PRO/DX INJ NEW DRUG ADDON: CPT

## 2019-08-23 PROCEDURE — 80053 COMPREHEN METABOLIC PANEL: CPT

## 2019-08-23 PROCEDURE — 84484 ASSAY OF TROPONIN QUANT: CPT

## 2019-08-23 PROCEDURE — 6360000002 HC RX W HCPCS: Performed by: EMERGENCY MEDICINE

## 2019-08-23 PROCEDURE — 99284 EMERGENCY DEPT VISIT MOD MDM: CPT

## 2019-08-23 RX ORDER — HYDROCHLOROTHIAZIDE 12.5 MG/1
12.5 CAPSULE, GELATIN COATED ORAL DAILY
COMMUNITY

## 2019-08-23 RX ORDER — 0.9 % SODIUM CHLORIDE 0.9 %
1000 INTRAVENOUS SOLUTION INTRAVENOUS ONCE
Status: COMPLETED | OUTPATIENT
Start: 2019-08-23 | End: 2019-08-23

## 2019-08-23 RX ORDER — FENTANYL CITRATE 50 UG/ML
50 INJECTION, SOLUTION INTRAMUSCULAR; INTRAVENOUS
Status: COMPLETED | OUTPATIENT
Start: 2019-08-23 | End: 2019-08-23

## 2019-08-23 RX ORDER — ONDANSETRON 2 MG/ML
4 INJECTION INTRAMUSCULAR; INTRAVENOUS ONCE
Status: COMPLETED | OUTPATIENT
Start: 2019-08-23 | End: 2019-08-23

## 2019-08-23 RX ADMIN — FAMOTIDINE 40 MG: 10 INJECTION, SOLUTION INTRAVENOUS at 21:56

## 2019-08-23 RX ADMIN — IOPAMIDOL 90 ML: 755 INJECTION, SOLUTION INTRAVENOUS at 22:10

## 2019-08-23 RX ADMIN — ONDANSETRON 4 MG: 2 INJECTION INTRAMUSCULAR; INTRAVENOUS at 21:56

## 2019-08-23 RX ADMIN — FENTANYL CITRATE 50 MCG: 50 INJECTION INTRAMUSCULAR; INTRAVENOUS at 21:56

## 2019-08-23 RX ADMIN — FENTANYL CITRATE 50 MCG: 50 INJECTION INTRAMUSCULAR; INTRAVENOUS at 23:06

## 2019-08-23 RX ADMIN — SODIUM CHLORIDE 1000 ML: 9 INJECTION, SOLUTION INTRAVENOUS at 21:58

## 2019-08-23 ASSESSMENT — PAIN SCALES - GENERAL
PAINLEVEL_OUTOF10: 7
PAINLEVEL_OUTOF10: 3

## 2019-08-24 VITALS
BODY MASS INDEX: 23.04 KG/M2 | OXYGEN SATURATION: 94 % | TEMPERATURE: 98.4 F | DIASTOLIC BLOOD PRESSURE: 78 MMHG | RESPIRATION RATE: 15 BRPM | WEIGHT: 126 LBS | SYSTOLIC BLOOD PRESSURE: 166 MMHG | HEART RATE: 64 BPM

## 2019-08-24 PROCEDURE — 84484 ASSAY OF TROPONIN QUANT: CPT

## 2019-08-24 PROCEDURE — 36415 COLL VENOUS BLD VENIPUNCTURE: CPT

## 2019-08-24 RX ORDER — ONDANSETRON 8 MG/1
8 TABLET, ORALLY DISINTEGRATING ORAL EVERY 8 HOURS PRN
Qty: 15 TABLET | Refills: 0 | Status: SHIPPED | OUTPATIENT
Start: 2019-08-24

## 2019-08-24 RX ORDER — HYDROCODONE BITARTRATE AND ACETAMINOPHEN 7.5; 325 MG/1; MG/1
1 TABLET ORAL EVERY 6 HOURS PRN
Qty: 12 TABLET | Refills: 0 | Status: SHIPPED | OUTPATIENT
Start: 2019-08-24 | End: 2019-08-27

## 2019-08-24 ASSESSMENT — ENCOUNTER SYMPTOMS
FACIAL SWELLING: 0
VOICE CHANGE: 0
BLOOD IN STOOL: 0
EYE DISCHARGE: 0
ABDOMINAL PAIN: 1
NAUSEA: 1
CONSTIPATION: 0
VOMITING: 1
SHORTNESS OF BREATH: 0
DIARRHEA: 0
APNEA: 0
SORE THROAT: 0
SINUS PRESSURE: 0
CHOKING: 0

## 2019-08-24 NOTE — ED PROVIDER NOTES
[facility]  eMERGENCY dEPARTMENT eNCOUnter      Pt Name: Michael Juárez  MRN: [id number]  [place] 1955  Date of evaluation: 8/23/2019  Provider: Mahendra Garcia MD    [address] [place] [address]       Chief Complaint   Patient presents with    Chest Pain     center of chest radiating into back and throat    Emesis         HISTORY OF PRESENT ILLNESS   (Location/Symptom, Timing/Onset,Context/Setting, Quality, Duration, Modifying Factors, Severity)  Note limiting factors. Michael Juárez is a 71 y.o. female who presents to the emergency department for evaluation of upper abdominal lower chest symptoms. 48-year-old female developed epigastric and chest pain approximately hour to 2 hours after eating [place] she suddenly began to have discomfort and pain. She vomited. She now presents for further evaluation. She had her gallbladder out she said years ago. No prior cardiac history non-smoker. She does not drink alcohol except maybe on occasion. No prior diagnosis matching her symptoms today. No prior history of GI disease she does not take any medication other than a mild diuretic. The history is provided by the patient and the spouse. NursingNotes were reviewed. REVIEW OF SYSTEMS    (2-9 systems for level 4, 10 or more for level 5)     Review of Systems   Constitutional: Negative for chills and fever. HENT: Negative for drooling, facial swelling, nosebleeds, sinus pressure, sore throat and voice change. Eyes: Negative for discharge. Respiratory: Negative for apnea, choking and shortness of breath. Cardiovascular: Positive for chest pain (She describes it more in the epigastric region of the chest). Negative for leg swelling. Gastrointestinal: Positive for abdominal pain, nausea and vomiting. Negative for blood in stool, constipation and diarrhea. Genitourinary: Negative for dysuria and enuresis.    Musculoskeletal: Negative for joint swelling. Skin: Negative for rash and wound. Neurological: Negative for seizures and syncope. Psychiatric/Behavioral: Negative for behavioral problems, hallucinations and suicidal ideas. All other systems reviewed and are negative. A complete review of systems was performed and is negative except as noted above in the HPI. PAST MEDICAL HISTORY   History reviewed. No pertinent past medical history. SURGICAL HISTORY       Past Surgical History:   Procedure Laterality Date    ANKLE FRACTURE SURGERY      APPENDECTOMY      HYSTERECTOMY      MASTECTOMY           CURRENT MEDICATIONS       Previous Medications    HYDROCHLOROTHIAZIDE (MICROZIDE) 12.5 MG CAPSULE    Take 12.5 mg by mouth daily       ALLERGIES     Butorphanol; Clindamycin; Morphine; Ketorolac tromethamine; Meperidine; Morphine and related; and Penicillins    FAMILY HISTORY     History reviewed. No pertinent family history.        SOCIAL HISTORY       Social History     Socioeconomic History    Marital status:      Spouse name: None    Number of children: None    Years of education: None    Highest education level: None   Occupational History    None   Social Needs    Financial resource strain: None    Food insecurity:     Worry: None     Inability: None    Transportation needs:     Medical: None     Non-medical: None   Tobacco Use    Smoking status: Never Smoker    Smokeless tobacco: Never Used   Substance and Sexual Activity    Alcohol use: Not Currently    Drug use: Never    Sexual activity: None   Lifestyle    Physical activity:     Days per week: None     Minutes per session: None    Stress: None   Relationships    Social connections:     Talks on phone: None     Gets together: None     Attends Confucianist service: None     Active member of club or organization: None     Attends meetings of clubs or organizations: None     Relationship status: None    Intimate partner violence:     Fear of current or ex

## 2019-08-25 LAB
EKG P AXIS: 25 DEGREES
EKG P-R INTERVAL: 154 MS
EKG Q-T INTERVAL: 352 MS
EKG QRS DURATION: 70 MS
EKG QTC CALCULATION (BAZETT): 390 MS
EKG T AXIS: 53 DEGREES

## 2019-08-28 LAB
EKG P AXIS: 48 DEGREES
EKG P-R INTERVAL: 150 MS
EKG Q-T INTERVAL: 364 MS
EKG QRS DURATION: 84 MS
EKG QTC CALCULATION (BAZETT): 399 MS
EKG T AXIS: 60 DEGREES

## 2020-02-04 ENCOUNTER — OFFICE VISIT (OUTPATIENT)
Dept: OBSTETRICS AND GYNECOLOGY | Facility: CLINIC | Age: 65
End: 2020-02-04

## 2020-02-04 VITALS
BODY MASS INDEX: 23.74 KG/M2 | SYSTOLIC BLOOD PRESSURE: 142 MMHG | DIASTOLIC BLOOD PRESSURE: 72 MMHG | WEIGHT: 129 LBS | HEIGHT: 62 IN

## 2020-02-04 DIAGNOSIS — Z09 ENCOUNTER FOR FOLLOW-UP EXAMINATION AFTER COMPLETED TREATMENT FOR CONDITIONS OTHER THAN MALIGNANT NEOPLASM: ICD-10-CM

## 2020-02-04 DIAGNOSIS — R61 NIGHT SWEATS: ICD-10-CM

## 2020-02-04 DIAGNOSIS — Z01.419 WELL WOMAN EXAM WITH ROUTINE GYNECOLOGICAL EXAM: Primary | ICD-10-CM

## 2020-02-04 DIAGNOSIS — N89.8 VAGINAL DRYNESS: ICD-10-CM

## 2020-02-04 DIAGNOSIS — N95.1 MENOPAUSAL SYMPTOMS: ICD-10-CM

## 2020-02-04 DIAGNOSIS — N39.3 SUI (STRESS URINARY INCONTINENCE, FEMALE): ICD-10-CM

## 2020-02-04 DIAGNOSIS — Z12.31 ENCOUNTER FOR SCREENING MAMMOGRAM FOR MALIGNANT NEOPLASM OF BREAST: ICD-10-CM

## 2020-02-04 PROCEDURE — G0101 CA SCREEN;PELVIC/BREAST EXAM: HCPCS | Performed by: OBSTETRICS & GYNECOLOGY

## 2020-02-04 RX ORDER — AMITRIPTYLINE HYDROCHLORIDE 25 MG/1
25 TABLET, FILM COATED ORAL NIGHTLY
Qty: 30 TABLET | Refills: 5 | Status: SHIPPED | OUTPATIENT
Start: 2020-02-04

## 2020-02-04 NOTE — PROGRESS NOTES
"Subjective   Chief Complaint   Patient presents with   • Annual Exam     pt here today for annual exam. pt voices that she has notices some urinary leakage. pt also c/o some pressure in pelvis and also rectal pressure.  pt voices no concerns.      Brianna Zambrano is a 64 y.o. year old  menopausal female presenting to be seen for her annual exam.  Overall, patient feeling well - she does admit to increase in MEGAN over the past 6-12 months.  The patient is status-post hysterectomy with BSO.   Hot flashes and night sweats are a significant problem - she recently had a DVT while wearing a cast and was taken off her HRT.    She is sexually active.  In the past year there has not been new sexual partners.  She denies any pain or problems.    Patient reports regular self breast exams: yes.  Patient denies any lumps or concerns.  She is s/p subcutaneous mastectomies, bilaterally, for \"numerous cysts\"  She exercises regularly: yes.  Rides horses, golfs, walks    She has noticed changes in height: no.    No Additional Complaints Reported    The following portions of the patient's history were reviewed and updated as appropriate:problem list, current medications, allergies, past family history, past medical history, past social history and past surgical history.  Social History    Tobacco Use      Smoking status: Never Smoker      Smokeless tobacco: Never Used    Review of Systems   Constitutional: Negative for activity change and unexpected weight change.   HENT: Negative for congestion.    Eyes: Positive for visual disturbance (wears glasses).   Respiratory: Negative for shortness of breath.    Cardiovascular: Negative for chest pain.   Gastrointestinal: Negative for abdominal pain, blood in stool, constipation and diarrhea.   Endocrine: Positive for heat intolerance (more at night). Negative for cold intolerance.   Genitourinary: Positive for enuresis (MEGAN with coughing or sneezing or lifting). Negative for difficulty " "urinating, dyspareunia, dysuria, frequency, pelvic pain, urgency, vaginal bleeding, vaginal discharge and vaginal pain.   Musculoskeletal: Negative for arthralgias, back pain, neck pain and neck stiffness.   Skin: Negative for rash.   Neurological: Negative for dizziness and headaches.   Psychiatric/Behavioral: Negative for dysphoric mood and sleep disturbance. The patient is not nervous/anxious.          Objective   /72   Ht 157.5 cm (62\")   Wt 58.5 kg (129 lb)   LMP 05/22/1995 (Approximate) Comment: Abnormal Paps  BMI 23.59 kg/m²   Physical Exam   Constitutional: She is oriented to person, place, and time. She appears well-developed and well-nourished. No distress.   HENT:   Head: Normocephalic and atraumatic.   Eyes: EOM are normal.   Neck: Normal range of motion. Neck supple. No thyromegaly present.   Cardiovascular: Normal rate and regular rhythm.   No murmur heard.  Pulmonary/Chest: Effort normal and breath sounds normal. Right breast exhibits no inverted nipple and no mass. Left breast exhibits no inverted nipple and no mass. No breast tenderness or discharge.   Abdominal: Soft. She exhibits no distension. There is no tenderness.   Genitourinary: Vagina normal. Rectal exam shows no external hemorrhoid, no internal hemorrhoid and anal tone normal (good tone). No breast tenderness or discharge. Pelvic exam was performed with patient supine. There is no tenderness or lesion on the right labia. There is no tenderness or lesion on the left labia. Right adnexum displays no tenderness and no fullness. Left adnexum displays no tenderness and no fullness. No bleeding in the vagina. No vaginal discharge found.   Genitourinary Comments: Patient s/p hysterectomy:  Uterus and cervix surgically absent.  Vaginal cuff unremarkable.  Labia normal, no lesions noted.  Urethral meatus unremarkable, no prolapse of mucosa.  Anus/perineum normal   Musculoskeletal: Normal range of motion.   Neurological: She is alert and " oriented to person, place, and time.   Skin: Skin is warm and dry.   Psychiatric: She has a normal mood and affect. Her behavior is normal. Judgment normal.   Nursing note and vitals reviewed.         Assessment & Plan    Brianna was seen today for annual exam.    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  Exam unremarkable, she reports regular SBE.  Routine labs with her PCP.  Colonoscopy not due for another year or two, per patient.  -     Mammo Screening Bilateral With CAD; Future  -     DEXA Bone Density Axial; Future    Vaginal dryness    Menopausal symptoms: Nights sweats really increased since her HRT was discontinued    MEGAN (stress urinary incontinence, female): Patient willing to try physical therapy.  She does not wear a pad every day, but when she does experience leakage it is always a full void - she has no ability to stop the flow.  -     Ambulatory Referral to Physical Therapy Evaluate and treat, Pelvic Floor    Encounter for screening mammogram for malignant neoplasm of breast   -     Mammo Screening Bilateral With CAD; Future    Encounter for follow-up examination after completed treatment for conditions other than malignant neoplasm   -     DEXA Bone Density Axial; Future    Night sweats  -     amitriptyline (ELAVIL) 25 MG tablet; Take 1 tablet by mouth Every Night.        This note was electronically signed.    Evy Henderson MD  2/4/2020  10:25 AM

## 2020-02-14 DIAGNOSIS — Z09 ENCOUNTER FOR FOLLOW-UP EXAMINATION AFTER COMPLETED TREATMENT FOR CONDITIONS OTHER THAN MALIGNANT NEOPLASM: ICD-10-CM

## 2020-02-14 DIAGNOSIS — Z01.419 WELL WOMAN EXAM WITH ROUTINE GYNECOLOGICAL EXAM: ICD-10-CM

## 2020-02-17 NOTE — PROGRESS NOTES
Please inform patient that her DEXA scan shows osteopenia.  I recommend Ca++ with vitamin D, and weight-bearing exercise.  Thx

## 2020-02-20 DIAGNOSIS — Z12.31 ENCOUNTER FOR SCREENING MAMMOGRAM FOR MALIGNANT NEOPLASM OF BREAST: ICD-10-CM

## 2020-02-20 DIAGNOSIS — Z01.419 WELL WOMAN EXAM WITH ROUTINE GYNECOLOGICAL EXAM: ICD-10-CM

## 2020-02-25 ENCOUNTER — TREATMENT (OUTPATIENT)
Dept: PHYSICAL THERAPY | Facility: CLINIC | Age: 65
End: 2020-02-25

## 2020-02-25 DIAGNOSIS — R53.1 WEAKNESS: ICD-10-CM

## 2020-02-25 DIAGNOSIS — N39.3 SUI (STRESS URINARY INCONTINENCE, FEMALE): Primary | ICD-10-CM

## 2020-02-25 DIAGNOSIS — N39.46 MIXED STRESS AND URGE URINARY INCONTINENCE: ICD-10-CM

## 2020-02-25 PROCEDURE — 97162 PT EVAL MOD COMPLEX 30 MIN: CPT | Performed by: PHYSICAL THERAPIST

## 2020-02-25 NOTE — PROGRESS NOTES
"Outpatient Physical Therapy Pelvic Health Initial Evaluation       Patient Name: Brianna Zambrano  : 1955  MRN: [id number]  Today's Date: 2020        Visit Date: 2020    There is no problem list on file for this patient.       Past Medical History:   Diagnosis Date   • Abnormal Pap smear of cervix    • Hypertension         Past Surgical History:   Procedure Laterality Date   • ADENOIDECTOMY     • ANKLE MEDIAL MALLEOUS EPIPHYSIODESIS W/ SCREW FIXATION     • APPENDECTOMY     • BREAST BIOPSY     •  SECTION     • CHOLECYSTECTOMY     • COLONOSCOPY     • FIRST RIB RESECTION     • HYSTERECTOMY      abdominal hysterectomy with BSO   • MASTECTOMY      subcutaneous bilateral   • SHOULDER SURGERY     • TONSILLECTOMY     • WISDOM TOOTH EXTRACTION           Visit Dx:    ICD-10-CM ICD-9-CM   1. MEGAN (stress urinary incontinence, female) N39.3 625.6   2. Weakness R53.1 780.79   3. Mixed stress and urge urinary incontinence N39.46 788.33           Pelvic Health     Row Name 20 0700             Subjective Comments    Subjective Comments  She reports long history of MEGAN, with coughing/sneezing/straining. She also reports urgency incontinence in the last 6-8 months, as well as some pelvic pressure in rectum and vagina, reports sharp type pain that only last a few seconds intermittently throughout the day. She reports leaking once a day and only small amounts. Does not wear pads. No constipation, no straining to empty and feels like she empties all the way. She has been on vaginal suppositories, but they did not help. She does not wake at night to use the restroom.   -KR         Pregnancy Questions    Number of Pregnancies  1  -KR      Number of Children  1  -KR      What is the birth weight of your largest child?  3.629 kg (8 lb)  -KR      Type of Previous Deliveries   emergency  -KR      Pregnancy Comments  reports head came out with vaginal delivery, they \"pushed her back in\" and then had a " csection  -KR         Pain Assessment    Pain Assessment  No/denies pain  -KR         Lumbosacral Palpation    Lumbosacral Palpation Comments  decreased TA activation felt with SLR  -KR         Pelvic Floor Muscle    Patient/Parent/Guardian Consented to Internal Pelvic Floor Exam  Yes  -KR      Strength (Right)  2: Squeeze no lift  -KR      Strength (Left)  1: Trace  -KR      Symmetry of Sustained Maximal Contraction  Right stronger than left  -KR      Internal Pelvic Floor Comments  did not formally assess endruance and timing secondary to weakness and cues for isolation  -KR      External Pelvic Floor Comments  no knack performed  -KR         Observations    Perineal Observation Performed?  Yes  -KR         Pelvic Floor    Ability to Isolate Contraction of Pelvic Floor  No  -KR      Overflow from Adjacent Muscles  Gluteus Luciano;Upper Abdominals  -KR      Other Pelvic Floor  holds breath  -KR         General ROM    GENERAL ROM COMMENTS  hip PROM WFL  -KR         MMT (Manual Muscle Testing)    General MMT Comments  hip abduction 4/5 (B)  -KR        User Key  (r) = Recorded By, (t) = Taken By, (c) = Cosigned By    Initials Name Provider Type    KR Milli Cuba, PT DPT Physical Therapist        PT Ortho     Row Name 02/25/20 0700       Quarter Clearing    Quarter Clearing  Lower Quarter Clearing  -KR       DTR- Lower Quarter Clearing    Patellar tendon (L2-4)  Bilateral:;2- Normal response  -KR    Achilles tendon (S1-2)  Bilateral:;2- Normal response  -KR       Pathological Reflexes- Lower Quarter Clearing    Clonus  Bilateral:;Negative  -KR       Sensory Screen for Light Touch- Lower Quarter Clearing    L1 (inguinal area)  Bilateral:;Intact  -KR    L2 (anterior mid thigh)  Bilateral:;Intact  -KR    L3 (distal anterior thigh)  Bilateral:;Intact  -KR    L4 (medial lower leg/foot)  Bilateral:;Intact  -KR    L5 (lateral lower leg/great toe)  Bilateral:;Intact  -KR    S1 (bottom of foot)  Bilateral:;Intact  -KR        Myotomal Screen- Lower Quarter Clearing    Hip flexion (L2)  Bilateral:;WNL  -KR    Knee extension (L3)  Bilateral:;WNL  -KR    Ankle DF (L4)  Bilateral:;WNL  -KR    Great toe extension (L5)  Bilateral:;WNL  -KR    Ankle PF (S1)  Bilateral:;WNL  -KR    Knee flexion (S2)  Bilateral:;WNL  -KR      User Key  (r) = Recorded By, (t) = Taken By, (c) = Cosigned By    Initials Name Provider Type    Milli Stein, PT DPT Physical Therapist                     PT Assessment/Plan     Row Name 02/25/20 0700          PT Assessment    Functional Limitations  Limitation in home management;Limitations in community activities;Performance in leisure activities;Performance in self-care ADL  -KR     Impairments  Muscle strength;Impaired muscle endurance  -KR     Assessment Comments  Brianna presents with long history of mixed urinary incontinence. Upon assessment she has decreased pelvic floor strenghth and coordination. She has slight dereased in core and hip strength contributing to increased strain on her pelvic floor. Skilled PT needed to address these deficits and progress with home program.   -KR     Please refer to paper survey for additional self-reported information  Yes  -KR     Rehab Potential  Good  -KR     Patient/caregiver participated in establishment of treatment plan and goals  Yes  -KR     Patient would benefit from skilled therapy intervention  Yes  -KR        PT Plan    Predicted Duration of Therapy Intervention (Therapy Eval)  8-10 visits  -KR     Planned CPT's?  PT EVAL MOD COMPLELITY: 05622;PT RE-EVAL: 79593;PT THER PROC EA 15 MIN: 42667;PT THER ACT EA 15 MIN: 32500;PT MANUAL THERAPY EA 15 MIN: 78146;PT NEUROMUSC RE-EDUCATION EA 15 MIN: 73447;PT GAIT TRAINING EA 15 MIN: 87286  -KR     PT Plan Comments  Initially I will work on her pelvic floor isolation and strengthening. We will then progress with more functional training, endurance and timing. I will also work on her core an dhip strengthening.    -KR       User Key  (r) = Recorded By, (t) = Taken By, (c) = Cosigned By    Initials Name Provider Type    Milli Stein, PT DPT Physical Therapist            OP Exercises     Row Name 02/25/20 0700             Subjective Comments    Subjective Comments  She reports long history of MEGAN, with coughing/sneezing/straining. She also reports urgency incontinence in the last 6-8 months, as well as some pelvic pressure in rectum and vagina, reports sharp type pain that only last a few seconds intermittently throughout the day. She reports leaking once a day and only small amounts. Does not wear pads. No constipation, no straining to empty and feels like she empties all the way. She has been on vaginal suppositories, but they did not help. She does not wake at night to use the restroom.   -KR        User Key  (r) = Recorded By, (t) = Taken By, (c) = Cosigned By    Initials Name Provider Type    Milli Stein, PT DPT Physical Therapist                      PT OP Goals     Row Name 02/25/20 0700          PT Short Term Goals    STG Date to Achieve  03/26/20  -KR     STG 1  Pt will demonstrate 3+/5 or greater pelvic floor strength.   -KR     STG 1 Progress  New  -KR     STG 2  Pt will be independent with initial HEP, including gravity assisted positions.   -KR     STG 2 Progress  New  -KR     STG 3  Pt will demonstrate proper TA contraction without compensation.   -KR     STG 3 Progress  New  -KR        Long Term Goals    LTG Date to Achieve  04/25/20  -KR     LTG 1  Pt will demonstrate the ability to hold a pelvic floor contraction 5-8 seconds or greater.   -KR     LTG 1 Progress  New  -KR     LTG 2  Pt will be independent with HEP including pelvic floor and core strengthening.   -KR     LTG 2 Progress  New  -KR     LTG 3  Pt will demonstrate the ability to hold contraction and no leaking with cough.   -KR     LTG 3 Progress  New  -KR        Time Calculation    PT Goal Re-Cert Due Date  03/26/20  -KR        User Key  (r) = Recorded By, (t) = Taken By, (c) = Cosigned By    Initials Name Provider Type    Milli Stein, PT DPT Physical Therapist          Therapy Education  Education Details: PFM in supine with gravity assist and bridges  Given: HEP, Symptoms/condition management, Posture/body mechanics  Program: New  How Provided: Verbal, Demonstration, Written  Provided to: Patient  Level of Understanding: Verbalized, Demonstrated               Time Calculation:                  Milli Cuba, PT DPT  2/25/2020

## 2020-02-26 NOTE — PROGRESS NOTES
"I signed off on this mammogram report, but we need to talk about reviewing our policy for even scanning in the ones that have the big \"preliminary report\" warning on them.  To my knowledge, we have no system for checking to make sure that the final report agreed with the preliminary one.  If the final one was amended and then got lost, I am not sure what the liability for that would be."

## 2020-03-03 ENCOUNTER — TREATMENT (OUTPATIENT)
Dept: PHYSICAL THERAPY | Facility: CLINIC | Age: 65
End: 2020-03-03

## 2020-03-03 DIAGNOSIS — N39.3 SUI (STRESS URINARY INCONTINENCE, FEMALE): Primary | ICD-10-CM

## 2020-03-03 DIAGNOSIS — N39.46 MIXED STRESS AND URGE URINARY INCONTINENCE: ICD-10-CM

## 2020-03-03 DIAGNOSIS — R53.1 WEAKNESS: ICD-10-CM

## 2020-03-03 PROCEDURE — 97110 THERAPEUTIC EXERCISES: CPT | Performed by: PHYSICAL THERAPIST

## 2020-03-03 NOTE — PROGRESS NOTES
Outpatient Physical Therapy Pelvic Health Treatment Note       Patient Name: Brianna Zambrano  : 1955  MRN: [id number]  Today's Date: 3/3/2020        Visit Date: 2020    Visit Dx:    ICD-10-CM ICD-9-CM   1. MEGAN (stress urinary incontinence, female) N39.3 625.6   2. Weakness R53.1 780.79   3. Mixed stress and urge urinary incontinence N39.46 788.33       There is no problem list on file for this patient.       Pelvic Health     Row Name 20 0700             Pelvic Floor Muscle    Patient/Parent/Guardian Consented to Internal Pelvic Floor Exam  Yes  -KR      Strength (Right)  2: Squeeze no lift  -KR      Strength (Left)  2: Squeeze no lift  -KR      Symmetry of Sustained Maximal Contraction  Symmetrical  -KR      Endurance (Ability to Hold Maximal Contraction)  1 sec  -KR      # of Reps of Maximal Contractions while Maintaining Endurance and Strength  0 unable to hold 10 seconds  -KR      Internal Pelvic Floor Comments  was able to get to 3/5 intermittently with verbal and tactile cues; fatigued after 2-3 reps at a time  -KR        User Key  (r) = Recorded By, (t) = Taken By, (c) = Cosigned By    Initials Name Provider Type    Milli Stein, PT DPT Physical Therapist                       PT Assessment/Plan     Row Name 20 07          PT Assessment    Assessment Comments  No goals met today, this was her first treatment session since initial evaluation. She fatigues quickly and only able to get 3/5 pelvic floor strength intermittently.   -KR        PT Plan    PT Plan Comments  Conitnue to work on pelvic floor strength and progressing with endurance and timing. Also work on core stability.   -KR       User Key  (r) = Recorded By, (t) = Taken By, (c) = Cosigned By    Initials Name Provider Type    Milli Stein, PT DPT Physical Therapist            OP Exercises     Row Name 20 0700             Total Minutes    26063 - PT Therapeutic Exercise Minutes  30  -KR          Exercise 1    Exercise Name 1  TA activation  -KR         Exercise 2    Exercise Name 2  PFM in hooklying  -KR      Additional Comments  see pelvic health tab  -KR        User Key  (r) = Recorded By, (t) = Taken By, (c) = Cosigned By    Initials Name Provider Type    Milli Stein, PT DPT Physical Therapist                      PT OP Goals     Row Name 03/03/20 0700          PT Short Term Goals    STG Date to Achieve  03/26/20  -KR     STG 1  Pt will demonstrate 3+/5 or greater pelvic floor strength.   -KR     STG 1 Progress  Ongoing  -KR     STG 1 Progress Comments  able to get to 3/5, but not consistently  -KR     STG 2  Pt will be independent with initial HEP, including gravity assisted positions.   -KR     STG 2 Progress  Ongoing  -KR     STG 3  Pt will demonstrate proper TA contraction without compensation.   -KR     STG 3 Progress  Ongoing  -KR        Long Term Goals    LTG Date to Achieve  04/25/20  -KR     LTG 1  Pt will demonstrate the ability to hold a pelvic floor contraction 5-8 seconds or greater.   -KR     LTG 1 Progress  Ongoing  -KR     LTG 2  Pt will be independent with HEP including pelvic floor and core strengthening.   -KR     LTG 2 Progress  Ongoing  -KR     LTG 3  Pt will demonstrate the ability to hold contraction and no leaking with cough.   -KR     LTG 3 Progress  Ongoing  -KR        Time Calculation    PT Goal Re-Cert Due Date  03/26/20  -KR       User Key  (r) = Recorded By, (t) = Taken By, (c) = Cosigned By    Initials Name Provider Type    Milli Stein PT DPT Physical Therapist           Therapy Education  Education Details: TA  Given: HEP, Symptoms/condition management  Program: Reinforced  How Provided: Verbal  Provided to: Patient  Level of Understanding: Verbalized              Time Calculation:   Total Timed Code Minutes- PT: 30 minute(s)                Milli Cuba PT DPT  3/3/2020

## 2020-03-10 ENCOUNTER — TREATMENT (OUTPATIENT)
Dept: PHYSICAL THERAPY | Facility: CLINIC | Age: 65
End: 2020-03-10

## 2020-03-10 DIAGNOSIS — N39.46 MIXED STRESS AND URGE URINARY INCONTINENCE: ICD-10-CM

## 2020-03-10 DIAGNOSIS — R53.1 WEAKNESS: ICD-10-CM

## 2020-03-10 DIAGNOSIS — N39.3 SUI (STRESS URINARY INCONTINENCE, FEMALE): Primary | ICD-10-CM

## 2020-03-10 PROCEDURE — 97110 THERAPEUTIC EXERCISES: CPT | Performed by: PHYSICAL THERAPIST

## 2020-03-10 NOTE — PROGRESS NOTES
Outpatient Physical Therapy Pelvic Health Treatment Note       Patient Name: Brianna Zambrano  : 1955  MRN: [id number]  Today's Date: 3/10/2020        Visit Date: 03/10/2020    Visit Dx:    ICD-10-CM ICD-9-CM   1. MEGAN (stress urinary incontinence, female) N39.3 625.6   2. Weakness R53.1 780.79   3. Mixed stress and urge urinary incontinence N39.46 788.33       There is no problem list on file for this patient.       Pelvic Health     Row Name 03/10/20 0700             Pelvic Floor Muscle    Patient/Parent/Guardian Consented to Internal Pelvic Floor Exam  Yes  -KR      Strength (Right)  -- 2+  -KR      Strength (Left)  -- 2+  -KR      Symmetry of Sustained Maximal Contraction  Right stronger than left  -KR      Endurance (Ability to Hold Maximal Contraction)  4 sec  -KR      # of Reps of Maximal Contractions while Maintaining Endurance and Strength  0 unable to hold 10 seconds  -KR        User Key  (r) = Recorded By, (t) = Taken By, (c) = Cosigned By    Initials Name Provider Type    Milli Stein, PT DPT Physical Therapist                       PT Assessment/Plan     Row Name 03/10/20 0700          PT Assessment    Assessment Comments  Two goals were met today, she showed improved pelvic floor endurance, but still intermittent strength and isolation. We were able to progress with core and hip strengthening.   -KR        PT Plan    PT Plan Comments  Conitnue to work on pelvic floor strength, endurance and timing, progressing with functional training as she improves.   -KR       User Key  (r) = Recorded By, (t) = Taken By, (c) = Cosigned By    Initials Name Provider Type    Milli Stein, PT DPT Physical Therapist            OP Exercises     Row Name 03/10/20 0700             Subjective Comments    Subjective Comments  She reports some soreness from doing the TA, but not always noticing her kegel  -KR         Subjective Pain    Able to rate subjective pain?  yes  -KR         Total Minutes     14036 - PT Therapeutic Exercise Minutes  40  -KR         Exercise 1    Exercise Name 1  BKFO  -KR      Cueing 1  Verbal  -KR      Sets 1  1  -KR      Reps 1  10  -KR         Exercise 2    Exercise Name 2  sidelying clams (B)  -KR      Cueing 2  Verbal  -KR      Sets 2  1  -KR      Reps 2  15  -KR         Exercise 3    Exercise Name 3  PFM in hooklying  -KR      Additional Comments  see pelvic health tab  -KR        User Key  (r) = Recorded By, (t) = Taken By, (c) = Cosigned By    Initials Name Provider Type    Milli Stein, PT DPT Physical Therapist                      PT OP Goals     Row Name 03/10/20 0700          PT Short Term Goals    STG Date to Achieve  03/26/20  -KR     STG 1  Pt will demonstrate 3+/5 or greater pelvic floor strength.   -KR     STG 1 Progress  Ongoing  -KR     STG 2  Pt will be independent with initial HEP, including gravity assisted positions.   -KR     STG 2 Progress  Met  -KR     STG 3  Pt will demonstrate proper TA contraction without compensation.   -KR     STG 3 Progress  Met  -KR        Long Term Goals    LTG Date to Achieve  04/25/20  -KR     LTG 1  Pt will demonstrate the ability to hold a pelvic floor contraction 5-8 seconds or greater.   -KR     LTG 1 Progress  Ongoing  -KR     LTG 2  Pt will be independent with HEP including pelvic floor and core strengthening.   -KR     LTG 2 Progress  Ongoing  -KR     LTG 3  Pt will demonstrate the ability to hold contraction and no leaking with cough.   -KR     LTG 3 Progress  Ongoing  -KR        Time Calculation    PT Goal Re-Cert Due Date  03/26/20  -KR       User Key  (r) = Recorded By, (t) = Taken By, (c) = Cosigned By    Initials Name Provider Type    Milli Stein, PT DPT Physical Therapist           Therapy Education  Education Details: BKFO, sidelying clam, kegel progression  Given: HEP, Symptoms/condition management, Posture/body mechanics  Program: Reinforced, Progressed  How Provided: Verbal, Demonstration,  Written  Provided to: Patient  Level of Understanding: Verbalized, Demonstrated              Time Calculation:   Total Timed Code Minutes- PT: 40 minute(s)                Milli Cuba, PT DPT  3/10/2020

## 2020-03-23 ENCOUNTER — TELEPHONE (OUTPATIENT)
Dept: PHYSICAL THERAPY | Facility: CLINIC | Age: 65
End: 2020-03-23

## 2020-03-23 DIAGNOSIS — N39.46 MIXED STRESS AND URGE URINARY INCONTINENCE: ICD-10-CM

## 2020-03-23 DIAGNOSIS — R53.1 WEAKNESS: ICD-10-CM

## 2020-03-23 DIAGNOSIS — N39.3 SUI (STRESS URINARY INCONTINENCE, FEMALE): Primary | ICD-10-CM

## 2020-03-23 NOTE — TELEPHONE ENCOUNTER
Left voicemail to cancel physical therapy appointment Tuesday 3/24. Asked her to call back to reschedule.

## 2020-06-29 ENCOUNTER — DOCUMENTATION (OUTPATIENT)
Dept: PHYSICAL THERAPY | Facility: CLINIC | Age: 65
End: 2020-06-29

## 2020-06-29 DIAGNOSIS — N39.3 SUI (STRESS URINARY INCONTINENCE, FEMALE): Primary | ICD-10-CM

## 2020-06-29 DIAGNOSIS — N39.46 MIXED STRESS AND URGE URINARY INCONTINENCE: ICD-10-CM

## 2020-06-29 DIAGNOSIS — R53.1 WEAKNESS: ICD-10-CM

## 2020-06-29 NOTE — PROGRESS NOTES
Outpatient Physical Therapy Discharge Summary         Patient Name: Brianna Zambrano  : 1955  MRN: [id number]    Today's Date: 2020    Visit Dx:    ICD-10-CM ICD-9-CM   1. MEGAN (stress urinary incontinence, female) N39.3 625.6   2. Weakness R53.1 780.79   3. Mixed stress and urge urinary incontinence N39.46 788.33       PT OP Goals     Row Name 20 1129          PT Short Term Goals    STG Date to Achieve  20  -KR     STG 1  Pt will demonstrate 3+/5 or greater pelvic floor strength.   -KR     STG 1 Progress  Not Met  -KR     STG 2  Pt will be independent with initial HEP, including gravity assisted positions.   -KR     STG 2 Progress  Met  -KR     STG 3  Pt will demonstrate proper TA contraction without compensation.   -KR     STG 3 Progress  Met  -KR        Long Term Goals    LTG Date to Achieve  20  -KR     LTG 1  Pt will demonstrate the ability to hold a pelvic floor contraction 5-8 seconds or greater.   -KR     LTG 1 Progress  Not Met  -KR     LTG 2  Pt will be independent with HEP including pelvic floor and core strengthening.   -KR     LTG 2 Progress  Not Met  -KR     LTG 3  Pt will demonstrate the ability to hold contraction and no leaking with cough.   -KR     LTG 3 Progress  Not Met  -KR       User Key  (r) = Recorded By, (t) = Taken By, (c) = Cosigned By    Initials Name Provider Type    Milli Stein, PT DPT Physical Therapist          OP PT Discharge Summary  Date of Discharge: 20  Reason for Discharge: other (comment)  Outcomes Achieved: Patient able to partially acheive established goals  Discharge Destination: Home with home program  Discharge Instructions/Additional Comments: Therapy was placed on hold secondary to COVID restrictions, then patient did not return for follow ups.       Time Calculation:                    Milli Cuba, PT DPT  2020

## 2022-11-09 ENCOUNTER — TELEPHONE (OUTPATIENT)
Dept: OTOLARYNGOLOGY | Facility: CLINIC | Age: 67
End: 2022-11-09